# Patient Record
Sex: MALE | Race: WHITE | Employment: OTHER | URBAN - METROPOLITAN AREA
[De-identification: names, ages, dates, MRNs, and addresses within clinical notes are randomized per-mention and may not be internally consistent; named-entity substitution may affect disease eponyms.]

---

## 2017-06-09 ENCOUNTER — HOSPITAL ENCOUNTER (OUTPATIENT)
Dept: NUCLEAR MEDICINE | Age: 82
Discharge: HOME | End: 2017-06-09
Attending: UROLOGY
Payer: MEDICARE

## 2017-06-09 DIAGNOSIS — C61 MALIGNANT NEOPLASM OF PROSTATE (HCC): ICD-10-CM

## 2017-06-09 PROCEDURE — 78306 BONE IMAGING WHOLE BODY: CPT

## 2017-07-18 ENCOUNTER — TELEPHONE (OUTPATIENT)
Dept: NEUROLOGY | Age: 82
End: 2017-07-18

## 2017-07-18 NOTE — TELEPHONE ENCOUNTER
----- Message from Pedro Luisrico Raygoza sent at 7/18/2017 12:36 PM EDT -----  Regarding: /Telephone  Pt's speech therapist(Demetrius Pompa) stated pt 's wife(Kailyn Ramires) left a message 2 wks ago for a NP appt for a  follow up to the ER, but never received a call back. Best contact number [phone].

## 2017-07-19 ENCOUNTER — OFFICE VISIT (OUTPATIENT)
Dept: NEUROLOGY | Age: 82
End: 2017-07-19

## 2017-07-19 VITALS
HEIGHT: 70 IN | DIASTOLIC BLOOD PRESSURE: 58 MMHG | HEART RATE: 52 BPM | SYSTOLIC BLOOD PRESSURE: 116 MMHG | BODY MASS INDEX: 22.19 KG/M2 | WEIGHT: 155 LBS | OXYGEN SATURATION: 97 %

## 2017-07-19 DIAGNOSIS — I65.23 BILATERAL CAROTID ARTERY STENOSIS: ICD-10-CM

## 2017-07-19 DIAGNOSIS — I69.359 CVA, OLD, HEMIPARESIS (HCC): Chronic | ICD-10-CM

## 2017-07-19 DIAGNOSIS — R13.10 DYSPHAGIA, UNSPECIFIED TYPE: ICD-10-CM

## 2017-07-19 DIAGNOSIS — R51.9 HEADACHE, UNSPECIFIED HEADACHE TYPE: ICD-10-CM

## 2017-07-19 DIAGNOSIS — I65.23 BILATERAL CAROTID ARTERY STENOSIS: Primary | ICD-10-CM

## 2017-07-19 DIAGNOSIS — G45.9 TRANSIENT CEREBRAL ISCHEMIA, UNSPECIFIED TYPE: ICD-10-CM

## 2017-07-19 DIAGNOSIS — R29.898 WEAKNESS OF LEFT LEG: Primary | ICD-10-CM

## 2017-07-19 DIAGNOSIS — Z86.73 HX OF COMPLETED STROKE: ICD-10-CM

## 2017-07-19 RX ORDER — AMLODIPINE BESYLATE 5 MG/1
5 TABLET ORAL DAILY
COMMUNITY

## 2017-07-19 RX ORDER — FERROUS SULFATE, DRIED 160(50) MG
1 TABLET, EXTENDED RELEASE ORAL 2 TIMES DAILY WITH MEALS
COMMUNITY

## 2017-07-19 RX ORDER — CARVEDILOL 3.12 MG/1
TABLET ORAL 2 TIMES DAILY WITH MEALS
COMMUNITY
End: 2017-08-11

## 2017-07-19 RX ORDER — CLOPIDOGREL BISULFATE 75 MG/1
75 TABLET ORAL DAILY
Qty: 30 TAB | Refills: 5 | Status: SHIPPED | OUTPATIENT
Start: 2017-07-19 | End: 2018-03-19 | Stop reason: SDUPTHER

## 2017-07-19 RX ORDER — CHOLECALCIFEROL (VITAMIN D3) 125 MCG
100 CAPSULE ORAL DAILY
COMMUNITY

## 2017-07-19 NOTE — PROGRESS NOTES
Name:  General Armenta      :  1930    PCP:   Manny Mobley MD      Referring:  [facility]  MRN:   [id number]    Chief Complaint:   Chief Complaint   Patient presents with    Headache       HISTORY OF PRESENT ILLNESS:     This is a 80 y.o. male who presents for evaluation of headache. Patient originally seen in  for TIA, then last clinic visit in 2013 (see below for details on that). He presents today with wife to discuss new issue, headaches. Pt describes that about 1 week ago, was having dinner with wife and started to have a sharp pain on right side of his head and began to shoot over to the left side of head. Pain was bothering him so they went to ER [facility]) and had CT head (due to prior hx of stroke) and was told that it looked normal.  Was given IV treatment for his his headache and he/ wife say it was better before he left. ER recommended patient follow up with Neurology. A few days later his home PT arrived to do daily therapy and told wife that patient's left leg weaker (historically has left arm weakness >> left leg weakness from a stroke in ). Had a scheduled Speech Therapy visit (part of his [address]) and wife says ST thought patient was having new or increased trouble with eating (holding food in mouth, coughing when eating) so she recommended a swallow study (ordered by PCP per wife). Pt has been taking  mg/ day, Norvasc 5 mg/ day, fish oil (can't take statins d/t side effects). BP looks great, HR a little low (low 50s), on carvedilol (started it few weeks ago, has seen cardiologist since then).      ===================  Prior Hx:    80 y.o. male seen as hospital follow up for an episode of blurred vision in left eye, and focal area of numbness in left side of face occurring in 2012. MRI done at that time didn't reveal a new stroke; chronic bilateral basal ganglia infarcts.  CTA neck at that time showed atherosclerosis of bilateral carotid bifurcations and bilateral cavernous ICA segments but without significant stenosis. There was no evidence of dissection. There was some atherosclerosis at the vertebrobasilar junction, with the right vertebral artery being patent/ dominant. Due to concern for a possible 4mm aneurysm, neurosurgery had been contacted but didn't think it was related to his symptoms at the time, so no acute/ urgent intervention. He followed up with [facility] after discharge and says that his physician over there reviewed the imaging studies and didn't think it was a true aneurysm, rather some atherosclerotic change. At follow up in June 2013 pt complained of numbness in the corner of his eye extending to left side of nose down to left corner of mouth, not involving left forehead, not extending down to jaw, with intermittent speech alteration (wife can't understand what he's saying), no associated  occurring daily but not constant. mittently. Wife says that when Says that when the numbness is coming on, his speech is altered (she has to ask him what he was saying), no No associated facial droop, no increased/ new weakness associated with episodes. Also was having episodic left eye blurred vision, eyes tearing up, no associated headaches. Pt say Ophthalmologist and I was told that that exam was fine. D/w patient that symptoms could be from recurrent TIAs and recommended changing from ASA to Plavix for secondary stroke risk reduction. Patient said that he had a bottle of plavix at home but decided not to start it after considering the possible side effects, but that he would reconsider. Also discussed with him that if the sensory symptoms continue, then I recommend we an EEG to check for simple partial seizure.     Complete Review of Systems: + cough, falls, headaches, hearing loss, incontinence, joint pain, leg swelling, memory loss, muscle pain, muscle weakness, snoring, swallowing; otherwise as noted in HPI No Known Allergies  Past Medical History:   Diagnosis Date    CAD (coronary artery disease)     Cancer ([facility] [address]     prostate    HTN (hypertension) 11/8/2012    Paralysis of upper limb (HCC)     Stroke ([facility]     left sided paralysis     Current Outpatient Prescriptions   Medication Sig Dispense Refill    amLODIPine (NORVASC) 5 mg tablet Take 5 mg by mouth daily.  calcium-vitamin D (OYSTER SHELL) 500 mg(1,250mg) -200 unit per tablet Take 1 Tab by mouth two (2) times daily (with meals).  co-enzyme Q-10 (CO Q-10) 100 mg capsule Take 100 mg by mouth daily.  omega 3-dha-epa-fish oil (FISH OIL) 100-160-1,000 mg cap Take  by mouth.  ACETAMINOPHEN (TYLENOL PO) Take  by mouth.  carvedilol (COREG) 3.125 mg tablet Take  by mouth two (2) times daily (with meals).  clopidogrel (PLAVIX) 75 mg tab Take 1 Tab by mouth daily. 30 Tab 5    polyethylene glycol (MIRALAX) 17 gram/dose powder Take 17 g by mouth daily. 1 tablespoon with 8 oz of water daily 119 g 1    famotidine (PEPCID AC) 20 mg tablet Take 20 mg by mouth two (2) times a day.  pravastatin (PRAVACHOL) 20 mg tablet Take 20 mg by mouth nightly.  METOPROLOL SUCCINATE PO Take 25 mg by mouth daily. Past Surgical History:   Procedure Laterality Date    CARDIAC SURG PROCEDURE UNLIST      CABG    HX APPENDECTOMY      HX HEENT      HX PROSTATECTOMY       Family History   Problem Relation Age of Onset    Heart Disease Father     Cancer Mother      unknown     Social History     Social History    Marital status:      Spouse name: N/A    Number of children: N/A    Years of education: N/A     Occupational History    Not on file.      Social History Main Topics    Smoking status: Former Smoker    Smokeless tobacco: Never Used    Alcohol use No    Drug use: Not on file    Sexual activity: Not on file     Other Topics Concern    Not on file     Social History Narrative       PHYSICAL EXAM  Vitals:    07/19/17 1400   BP: 116/58   BP 1 Location: Right arm   BP Patient Position: Sitting   Pulse: (!) 52   SpO2: 97%   Weight: 70.3 kg (155 lb)   Height: 5' 9.5\" (1.765 m)       General:  Resting in wheelchair, awake, alert, conversant  Holding left arm d/t left arm weakness   Head:  Normocephalic, atraumatic. Eyes:  Conjunctivae/corneas clear   Lungs:  Heart:  Non labored breathing  Regular rate, rhythm   Extremities: No edema. Skin: Mild scattered ecchymosis in forearms/ arms    Neurologic Exam       Language: normal  Memory:  Alert, oriented to person, place, situation    Cranial Nerves:  I: smell Not tested   II: visual fields Full to confrontation   II: pupils Pupils small, 2 mm/ slowly reactive to light    II: optic disc Unable to check due to miosis   III,VII: ptosis none   III,IV,VI: extraocular muscles  normal   V: facial light touch sensation  normal   VII: facial muscle function  symmetric   VIII: hearing symmetric   IX: soft palate elevation  normal   XI: sternocleidomastoid strength 5/5 on right, 1/ 5 on left (old)   XII: tongue  midline      Motor:   Right upper and lower ext: 5/ 5  Left upper ext: spasticity, 3/5  Left lower ext no spasticity, 4/5 proximally, 5/5 distally    Sensory:  Intact LT, temperature in both arms  Mild patchy reduced vibration in lower legs  Intact Temp in both feet and legs    Cerebellar: no rest tremor  Normal FNF on right; can't test left arm d/t weakness    Reflexes: 1+ biceps, patellars, absent achilles  Plantar response: neutral bilaterally    Gait: spastic left hemiparetic gait  Romberg not tested     No outside clinic notes/ imaging reports available for review    ASSESSMENT AND PLAN    ICD-10-CM ICD-9-CM    1. Weakness of left leg R29.898 729.89 MRI BRAIN WO CONT   2. Dysphagia, unspecified type R13.10 787.20    3. Headache, unspecified headache type R51 784.0    4. Bilateral carotid artery stenosis I65.23 433.10 DUPLEX CAROTID BILATERAL AMB NEURO     433.30    5.  Transient cerebral ischemia, unspecified type G45.9 435.9 MRI BRAIN WO CONT      MRA BRAIN WO CONT   6. Hx of completed stroke Z86.73 V12.54 clopidogrel (PLAVIX) 75 mg tab       80 y.o. male with multiple stroke risk factors, with:    1) dysphagia and subjective increased left leg weakness  Discussed patient and wife that left leg doesn't seem very weak to me (almost like prior exam) but his PT who has been working with him more recently than me felt that there was indeed some increased weakness. Similarly, his Speech Therapist felt that his swallowing has worsened and swallow study has been ordered to evaluate for that. D/w patient-wife that it's possible he had a recent TIA or small stroke. Will check MRI Brain, MRA Brain, Carotid Dopplers, to evaluate for that possibility. Advised patient to stop taking ASA and start taking Plavix 75 mg/ day. 2) New onset headache:  Transient and resolved now. No indication to start a daily headache preventive or Rx acute headache pain reliever. 3) Follow up in 3 weeks to go over results.       Sincerely,  Ame Veliz MD

## 2017-07-19 NOTE — MR AVS SNAPSHOT
Visit Information Date & Time Provider Department Dept. Phone Encounter #  
 7/19/2017  2:00 PM Jose Roberto Verde MD [facility] Neurology [facility] [phone] [id number] Upcoming Health Maintenance Date Due DTaP/Tdap/Td series (1 - Tdap) 12/19/1951 ZOSTER VACCINE AGE 60> 12/19/1990 GLAUCOMA SCREENING Q2Y 12/19/1995 Pneumococcal 65+ High/Highest Risk (1 of 2 - PCV13) 12/19/1995 MEDICARE YEARLY EXAM 12/19/1995 INFLUENZA AGE 9 TO ADULT 8/1/2017 Allergies as of 7/19/2017  Review Complete On: 7/19/2017 By: Fitz Jim LPN No Known Allergies Current Immunizations  Never Reviewed No immunizations on file. Not reviewed this visit You Were Diagnosed With   
  
 Codes Comments Weakness of left leg    -  Primary ICD-10-CM: R29.898 ICD-9-CM: 729.89 Hx of completed stroke     ICD-10-CM: Z86.73 
ICD-9-CM: V12.54 Transient cerebral ischemia, unspecified type     ICD-10-CM: G45.9 ICD-9-CM: 435.9 Bilateral carotid artery stenosis     ICD-10-CM: I65.23 ICD-9-CM: 433.10, 433.30 Vitals BP Pulse Height(growth percentile) Weight(growth percentile) SpO2 BMI  
 116/58 (BP 1 Location: Right arm, BP Patient Position: Sitting) (!) 52 5' 9.5\" (1.765 m) 155 lb (70.3 kg) 97% 22.56 kg/m2 Smoking Status Former Smoker BMI and BSA Data Body Mass Index Body Surface Area  
 22.56 kg/m 2 1.86 m 2 Preferred Pharmacy Pharmacy Name Phone [facility] [address] [phone] Your Updated Medication List  
  
   
This list is accurate as of: 7/19/17  3:00 PM.  Always use your most recent med list.  
  
  
  
  
 calcium-vitamin D 500 mg(1,250mg) -200 unit per tablet Commonly known as:  OYSTER SHELL Take 1 Tab by mouth two (2) times daily (with meals). carvedilol 3.125 mg tablet Commonly known as:  Nova Salts  
 Take  by mouth two (2) times daily (with meals). clopidogrel 75 mg Tab Commonly known as:  PLAVIX Take 1 Tab by mouth daily. co-enzyme Q-10 100 mg capsule Commonly known as:  CO Q-10 Take 100 mg by mouth daily. FISH -160-1,000 mg Cap Generic drug:  omega 3-dha-epa-fish oil Take  by mouth. METOPROLOL SUCCINATE PO Take 25 mg by mouth daily. NORVASC 5 mg tablet Generic drug:  amLODIPine Take 5 mg by mouth daily. PEPCID AC 20 mg tablet Generic drug:  famotidine Take 20 mg by mouth two (2) times a day. polyethylene glycol 17 gram/dose powder Commonly known as:  Ferol Carlton Take 17 g by mouth daily. 1 tablespoon with 8 oz of water daily  
  
 pravastatin 20 mg tablet Commonly known as:  PRAVACHOL Take 20 mg by mouth nightly. TYLENOL PO Take  by mouth. Prescriptions Sent to Pharmacy Refills  
 clopidogrel (PLAVIX) 75 mg tab 5 Sig: Take 1 Tab by mouth daily. Class: Normal  
 Pharmacy: [address] #: [phone] Route: Oral  
  
To-Do List   
 07/19/2017 Imaging:  DUPLEX CAROTID BILATERAL AMB NEURO   
  
 07/19/2017 Imaging:  MRA BRAIN WO CONT   
  
 07/19/2017 Imaging:  MRI BRAIN WO CONT \[facility]\"" & [facility] SERVICES! Dear Marissa Salinas: 
Thank you for requesting a [employer] account. Our records indicate that you already have an active [employer] account. You can access your account anytime at [email or web address] Did you know that you can access your hospital and ER discharge instructions at any time in [employer]? You can also review all of your test results from your hospital stay or ER visit. Additional Information If you have questions, please visit the Frequently Asked Questions section of the [employer] website at [email or web address] Remember, [employer] is NOT to be used for urgent needs.  For medical emergencies, dial 911. Now available from your iPhone and Android! Please provide this summary of care documentation to your next provider. Your primary care clinician is listed as Maryuri Daley . If you have any questions after today's visit, please call [phone].

## 2017-07-19 NOTE — PROGRESS NOTES
Patients wife reports 2 weeks ago patient had a headache similar to the one he had during his stroke in 2012. She took him to the [facility] ER on June 24, 2017 and they stated they did not see a stroke on the CT scan. According to wife, his physical therapist has noticed a difference in his gait and facial drooping. Wife states when he swallows a drink he coughs and will hold the liquid in his mouth before swallowing. He has continued to have a headache off and on since June 24th.

## 2017-07-23 PROBLEM — I65.23 BILATERAL CAROTID ARTERY STENOSIS: Status: ACTIVE | Noted: 2017-07-23

## 2017-07-23 PROBLEM — G45.9 TRANSIENT CEREBRAL ISCHEMIA: Status: ACTIVE | Noted: 2017-07-23

## 2017-07-23 PROBLEM — R51.9 HEADACHE: Status: ACTIVE | Noted: 2017-07-23

## 2017-07-23 PROBLEM — R13.10 DYSPHAGIA: Status: ACTIVE | Noted: 2017-07-23

## 2017-07-24 NOTE — PROCEDURES
Carotid Doppler:     Date:  07/19/17    Requesting Physician:  Dr. Eagle Carrasco     Indication:  Carotid stenosis and previous stroke. B-mode imaging reveals mixed plaque at the bifurcations extending into the proximal internal carotid artery segments bilaterally. Doppler spectral analysis reveals no elevated velocities. Vertebral artery flow antegrade on the right, not visualized on the left. Interpretation:  Plaque as noted. Stenosis estimated at 16-49% bilateral ICA. The lack of visualization of the left vertebral artery is likely technical in nature, although consider acquired occlusion if clinically indicated.

## 2017-07-25 ENCOUNTER — TELEPHONE (OUTPATIENT)
Dept: NEUROLOGY | Age: 82
End: 2017-07-25

## 2017-07-25 DIAGNOSIS — R13.10 DYSPHAGIA, UNSPECIFIED TYPE: Primary | ICD-10-CM

## 2017-07-25 NOTE — TELEPHONE ENCOUNTER
Contacted speech therapist Ayala Ledezma with [facility]. She states patient has been having speech and swallowing difficulty. She and wife have reached out to the PCP, but PCP will not order the study without patient going into the office. Patients wife states she does not have the money to keep taking patient to the PCP when all they need is the order. Wife told speech therapist Dr. Stacie Hannon would order the barium swallow study and that it was discussed at last office visit. Please advise.

## 2017-07-25 NOTE — TELEPHONE ENCOUNTER
I can order the study but patient but any orders related to continuing or changing the speech therapy based on the barium swallow study, would have to come from the PCP. So the patient would have to return to the PCP at some point.

## 2017-07-26 ENCOUNTER — HOSPITAL ENCOUNTER (OUTPATIENT)
Dept: MRI IMAGING | Age: 82
Discharge: HOME | End: 2017-07-26
Attending: PSYCHIATRY & NEUROLOGY
Payer: MEDICARE

## 2017-07-26 ENCOUNTER — TELEPHONE (OUTPATIENT)
Dept: NEUROLOGY | Age: 82
End: 2017-07-26

## 2017-07-26 DIAGNOSIS — R29.898 WEAKNESS OF LEFT LEG: ICD-10-CM

## 2017-07-26 DIAGNOSIS — G45.9 TRANSIENT CEREBRAL ISCHEMIA, UNSPECIFIED TYPE: ICD-10-CM

## 2017-07-26 PROCEDURE — 70544 MR ANGIOGRAPHY HEAD W/O DYE: CPT

## 2017-07-26 PROCEDURE — 70551 MRI BRAIN STEM W/O DYE: CPT

## 2017-07-26 NOTE — TELEPHONE ENCOUNTER
Meche Skaggs. Informed her the barium swallow study has been ordered. Informed her further orders regarding speech therapy will have to come from PCP. Also, informed her the study needs to be complete by 8/11/17 since that's when his f/up appt is. Gave her the number for scheduling. She voiced understanding and will call back if any further questions or concerns.

## 2017-08-02 ENCOUNTER — HOSPITAL ENCOUNTER (OUTPATIENT)
Dept: GENERAL RADIOLOGY | Age: 82
Discharge: HOME | End: 2017-08-02
Attending: PSYCHIATRY & NEUROLOGY
Payer: MEDICARE

## 2017-08-02 DIAGNOSIS — R13.10 DYSPHAGIA, UNSPECIFIED TYPE: ICD-10-CM

## 2017-08-02 PROCEDURE — 74220 X-RAY XM ESOPHAGUS 1CNTRST: CPT

## 2017-08-03 ENCOUNTER — TELEPHONE (OUTPATIENT)
Dept: NEUROLOGY | Age: 82
End: 2017-08-03

## 2017-08-03 DIAGNOSIS — R13.10 DYSPHAGIA, UNSPECIFIED TYPE: Primary | ICD-10-CM

## 2017-08-03 NOTE — TELEPHONE ENCOUNTER
Received call from Russell Kingsley at [facility]. She states patient had the wrong barium swallow study done. Patient needs the Modified Barium Swallow Study. She spoke with BODØ at the radiology dept and they are willing to redo the correct test free of charge but they need an order for it. Explained to her Dr José Manuel Zazueta is out of the office, but I will see if MD on call will sign order. She will need it faxed to Liset's attention at [phone].

## 2017-08-07 ENCOUNTER — HOSPITAL ENCOUNTER (OUTPATIENT)
Dept: GENERAL RADIOLOGY | Age: 82
Discharge: HOME | End: 2017-08-07
Attending: PSYCHIATRY & NEUROLOGY
Payer: MEDICARE

## 2017-08-07 DIAGNOSIS — R13.12 OROPHARYNGEAL DYSPHAGIA: ICD-10-CM

## 2017-08-07 DIAGNOSIS — R13.10 DYSPHAGIA: ICD-10-CM

## 2017-08-07 PROCEDURE — 74230 X-RAY XM SWLNG FUNCJ C+: CPT

## 2017-08-07 PROCEDURE — G8996 SWALLOW CURRENT STATUS: HCPCS

## 2017-08-07 PROCEDURE — G8998 SWALLOW D/C STATUS: HCPCS

## 2017-08-07 PROCEDURE — G8997 SWALLOW GOAL STATUS: HCPCS

## 2017-08-07 PROCEDURE — 92611 MOTION FLUOROSCOPY/SWALLOW: CPT

## 2017-08-07 NOTE — PROGRESS NOTES
[address]    Speech Pathology Modified barium swallow Study with cms g codes  Patient: Dipak Sosa (89 y.o. male)  Date: 8/7/2017  Referring Provider:  Alexei Landry:   Patient had no c/o dysphagia. He reports a CVA sometime in the last year. OBJECTIVE:   Past Medical History:   Past Medical History:   Diagnosis Date    CAD (coronary artery disease)     Cancer ([facility] [address]     prostate    HTN (hypertension) 11/8/2012    Paralysis of upper limb ([facility] [address]     Stroke ([facility] [address]     left sided paralysis     Past Surgical History:   Procedure Laterality Date    CARDIAC SURG PROCEDURE UNLIST      CABG    HX APPENDECTOMY      HX HEENT      HX PROSTATECTOMY       Current Dietary Status:  Regular, thins? Radiologist: Cherri Hamman  Film Views: Lateral  Patient Position: upright in Hausted chair    Trial 1: Trial 2:   Consistency Presented: Thin liquid; Solid;Pill/Tablet;Puree     How Presented: Self-fed/presented;Cup/gulp; Spoon;Straw      ORAL PHASE:      Bolus Acceptance: No impairment     Bolus Formation/Control: No impairment:    :     Propulsion: No impairment     Oral Residue: Lingual (mild)   PHARYNGEAL PHASE:      Initiation of Swallow: Triggered at vallecula     Timing: Pooling 1-5 sec     Penetration: Flash/transient (with thins)      Pharynx cleared after the swallow. When xray was turned on in between swallows, he had some mild pyriform residue. ?  From oral residue (he c/o material coating in and around teeth) or reflux. (probably not, since radiologist followed pill down through esophagus without issues)                                     Trial 3: Trial 4:                            :    :                                                                                                    In compliance with CMSs Claims Based Outcome Reporting, the following G-code set was chosen for this patient based the use of the [facility] functional outcome to quantify this patient's level of swallowing impairment. Using the NOMS, the patient was determined to be at level 6 for swallow function which correlates with the CI= 1-19% level of severity. Based on the objective assessment provided within this note, the current, goal, and discharge g-codes are as follows:    Swallow  Swallowing:   Swallow Current Status CI= 1-19%   Swallow Goal Status CI= 1-19%   Swallow D/C Status CI= 1-19%        NOMS Swallowing Levels:  Level 1 (CN): NPO  Level 2 (CM): NPO but takes consistency in therapy  Level 3 (CL): Takes less than 50% of nutrition p.o. and continues with nonoral feedings; and/or safe with mod cues; and/or max diet restriction  Level 4 (CK): Safe swallow but needs mod cues; and/or mod diet restriction; and/or still requires some nonoral feeding/supplements  Level 5 (CJ): Safe swallow with min diet restriction; and/or needs min cues  Level 6 (CI): Independent with p.o.; rare cues; usually self cues; may need to avoid some foods or needs extra time  Level 7 ([address] [place] [address]): Independent for all p.o.  SELVIN. (2003). National Outcomes Measurement System (NOMS): Adult Speech-Language Pathology User's Guide. ASSESSMENT :  Based on the objective data described above, the patient presents with mild oral-pharyngeal dysphagia with possible mild oral residue that possibly spilled to pharynx after the swallow. Mild penetration with thins that cleared with the force of the swallow. PLAN/RECOMMENDATIONS :  Patient appears safe for regular diet, thin liquids. Watch during a meal for oral residue or s/s of reflux. COMMUNICATION/EDUCATION:   The above findings and recommendations were discussed with: patient who verbalized understanding.     Thank you for this referral.Denise Fraser Gosselin, SLP  Time Calculation: 20 mins

## 2017-08-11 ENCOUNTER — OFFICE VISIT (OUTPATIENT)
Dept: NEUROLOGY | Age: 82
End: 2017-08-11

## 2017-08-11 VITALS
SYSTOLIC BLOOD PRESSURE: 110 MMHG | HEART RATE: 51 BPM | BODY MASS INDEX: 22.19 KG/M2 | OXYGEN SATURATION: 98 % | DIASTOLIC BLOOD PRESSURE: 68 MMHG | HEIGHT: 70 IN | WEIGHT: 155 LBS

## 2017-08-11 DIAGNOSIS — R13.10 DYSPHAGIA, UNSPECIFIED TYPE: ICD-10-CM

## 2017-08-11 DIAGNOSIS — R29.898 TRANSIENT LEFT LEG WEAKNESS: Primary | ICD-10-CM

## 2017-08-11 DIAGNOSIS — I69.359 CVA, OLD, HEMIPARESIS (HCC): Chronic | ICD-10-CM

## 2017-08-11 NOTE — MR AVS SNAPSHOT
Visit Information Date & Time Provider Department Dept. Phone Encounter #  
 8/11/2017 11:00 AM Godfrey Luo MD [facility] Neurology [facility] [phone] [id number] Follow-up Instructions Return if symptoms worsen or fail to improve. Upcoming Health Maintenance Date Due DTaP/Tdap/Td series (1 - Tdap) 12/19/1951 ZOSTER VACCINE AGE 60> 10/19/1990 GLAUCOMA SCREENING Q2Y 12/19/1995 Pneumococcal 65+ High/Highest Risk (1 of 2 - PCV13) 12/19/1995 MEDICARE YEARLY EXAM 12/19/1995 INFLUENZA AGE 9 TO ADULT 8/1/2017 Allergies as of 8/11/2017  Review Complete On: 8/11/2017 By: Terrie Mohan LPN No Known Allergies Current Immunizations  Never Reviewed No immunizations on file. Not reviewed this visit Vitals BP Pulse Height(growth percentile) Weight(growth percentile) SpO2 BMI  
 110/68 (BP 1 Location: Left arm, BP Patient Position: Sitting) (!) 51 5' 9.5\" (1.765 m) 155 lb (70.3 kg) 98% 22.56 kg/m2 Smoking Status Former Smoker BMI and BSA Data Body Mass Index Body Surface Area  
 22.56 kg/m 2 1.86 m 2 Preferred Pharmacy Pharmacy Name Phone [facility] [address] [phone] Your Updated Medication List  
  
   
This list is accurate as of: 8/11/17 11:29 AM.  Always use your most recent med list.  
  
  
  
  
 calcium-vitamin D 500 mg(1,250mg) -200 unit per tablet Commonly known as:  OYSTER SHELL Take 1 Tab by mouth two (2) times daily (with meals). clopidogrel 75 mg Tab Commonly known as:  PLAVIX Take 1 Tab by mouth daily. co-enzyme Q-10 100 mg capsule Commonly known as:  CO Q-10 Take 100 mg by mouth daily. FISH -160-1,000 mg Cap Generic drug:  omega 3-dha-epa-fish oil Take  by mouth. NORVASC 5 mg tablet Generic drug:  amLODIPine Take 5 mg by mouth daily. PEPCID AC 20 mg tablet Generic drug:  famotidine Take 20 mg by mouth two (2) times a day. polyethylene glycol 17 gram/dose powder Commonly known as:  Nova Hussar Take 17 g by mouth daily. 1 tablespoon with 8 oz of water daily TYLENOL PO Take  by mouth. Follow-up Instructions Return if symptoms worsen or fail to improve. Introducing \[facility]\"" & [facility] SERVICES! Dear Ayesha Way: 
Thank you for requesting a [employer] account. Our records indicate that you already have an active [employer] account. You can access your account anytime at [email or web address] Did you know that you can access your hospital and ER discharge instructions at any time in [employer]? You can also review all of your test results from your hospital stay or ER visit. Additional Information If you have questions, please visit the Frequently Asked Questions section of the [employer] website at [email or web address] Remember, [employer] is NOT to be used for urgent needs. For medical emergencies, dial 911. Now available from your iPhone and Android! Please provide this summary of care documentation to your next provider. Your primary care clinician is listed as Lili Galvan. If you have any questions after today's visit, please call [phone].

## 2017-08-11 NOTE — PROGRESS NOTES
Interval HPI:   This is a 80 y.o. male who is following up for     Chief Complaint   Patient presents with    Results     Interval hx    Patient following up with wife to go over test results regarding dysphagia (noted by his Home Health/ Speech Therapist) and subjective increased left leg weakness (noted by his Home Health/ PT). Checked MRI Brain, MRA Brain, Carotid Dopplers, to evaluate for possible recurrent stroke. Advised him to stop taking ASA and start taking Plavix 75 mg/ day. MRI Brain: no evidence of recent stroke; has mild-moderate age-appropriate cerebral atrophy, moderate chronic small vessel ischemic changes. MRA Brain shows dominant right vertebral artery, small left vertebral artery, patent basilar artery, atherosclerosis in the cavernous ICAs, no aneurysms or areas of significant stenosis. Carotid Dopplers:  Stenosis estimated at 16-49% bilateral ICA. The lack of visualization of the left vertebral artery is likely technical in nature, although consider acquired occlusion if clinically indicated. Underwent Barium Swallow study and that didn't show any significant problems (report reviewed). Not complaining of any new symptoms, still feels like things get caught in throat from time to time.        ===================  Prior Hx:     80 y.o. male seen as hospital follow up for an episode of blurred vision in left eye, and focal area of numbness in left side of face occurring in Nov 2012. MRI done at that time didn't reveal a new stroke; chronic bilateral basal ganglia infarcts.  CTA neck at that time showed atherosclerosis of bilateral carotid bifurcations and bilateral cavernous ICA segments but without significant stenosis.  There was no evidence of dissection.   There was some atherosclerosis at the vertebrobasilar junction, with the right vertebral artery being patent/ dominant.  Due to concern for a possible 4mm aneurysm, neurosurgery had been contacted but didn't think it was related to his symptoms at the time, so no acute/ urgent intervention.  He followed up with [facility] after discharge and says that his physician over there reviewed the imaging studies and didn't think it was a true aneurysm, rather some atherosclerotic change. At follow up in June 2013 pt complained of numbness in the corner of his eye extending to left side of nose down to left corner of mouth, not involving left forehead, not extending down to jaw, with intermittent speech alteration (wife can't understand what he's saying), no associated  occurring daily but not constant. mittently. Bufford Collet says that when Says that when the numbness is coming on, his speech is altered (she has to ask him what he was saying), no No associated facial droop, no increased/ new weakness associated with episodes. Also was having episodic left eye blurred vision, eyes tearing up, no associated headaches. Pt say Ophthalmologist and I was told that that exam was fine. D/w patient that symptoms could be from recurrent TIAs and recommended changing from ASA to Plavix for secondary stroke risk reduction.  Patient said that he had a bottle of plavix at home but decided not to start it after considering the possible side effects, but that he would reconsider. Also discussed with him that if the sensory symptoms continue, then I recommend we an EEG to check for simple partial seizure. Brief ROS: as above or otherwise negative  There have been no significant changes in PMHx, PSHx, SHx except as noted above. No Known Allergies  Current Outpatient Prescriptions   Medication Sig Dispense Refill    amLODIPine (NORVASC) 5 mg tablet Take 5 mg by mouth daily.  calcium-vitamin D (OYSTER SHELL) 500 mg(1,250mg) -200 unit per tablet Take 1 Tab by mouth two (2) times daily (with meals).  co-enzyme Q-10 (CO Q-10) 100 mg capsule Take 100 mg by mouth daily.       omega 3-dha-epa-fish oil (FISH OIL) 100-160-1,000 mg cap Take  by mouth.  ACETAMINOPHEN (TYLENOL PO) Take  by mouth.  clopidogrel (PLAVIX) 75 mg tab Take 1 Tab by mouth daily. 30 Tab 5    polyethylene glycol (MIRALAX) 17 gram/dose powder Take 17 g by mouth daily. 1 tablespoon with 8 oz of water daily 119 g 1    famotidine (PEPCID AC) 20 mg tablet Take 20 mg by mouth two (2) times a day. Physical Exam  Blood pressure 110/68, pulse (!) 51, height 5' 9.5\" (1.765 m), weight 70.3 kg (155 lb), SpO2 98 %. No acute distress  Neck: no stiffness  Skin: no rashes    Focused Neurological Exam     Mental status: Alert and oriented to person, place situation. Language: normal fluency and comprehension; no dysarthria. CNs:   Visual fields grossly normal  Extraocular movements intact, no nystagmus  Face appears symmetric and facial strength normal.    Hearing is intact to casual conversation. Sensory: intact light touch in all 4 extremities  Motor:   Right upper/ lower ext: 5/ 5  Left upper ext: 3/5 (spasticity)  Left lower ext: 4/ 5 proximally, 5/ 5 distally    Reflexes: 1+ biceps, patellars (symmetric)  Gait: not observed today    Impression      ICD-10-CM ICD-9-CM    1. Transient left leg weakness R29.898 729.89    2. Dysphagia, unspecified type R13.10 787.20    3. CVA, old, hemiparesis [facility]) I69.359 18.23        80 y.o. 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 Has been having subjective dysphagia, Barium Swallow study was largely unremarkable. Home PT felt that left leg strength was worsened, but I couldn't appreciate that on my exam (when compared to past exam) and MRI Brain doesn't show any new areas of stroke. Advised pt to continue taking the Plavix 75 mg/ day and stay on his BP meds (looks great today). LDL in Nov 2012 was slightly higher than goal (< 70 in setting of hx of TIA/ stroke). Recommend you recheck FLP and if LDL remains above 70, then start low-dose statin.   No other active neurologic problems that warrant regular follow up visits. He's welcome to return at any time.

## 2017-09-06 PROBLEM — R29.898 TRANSIENT LEFT LEG WEAKNESS: Status: ACTIVE | Noted: 2017-09-06

## 2018-02-23 DIAGNOSIS — Z86.73 HX OF COMPLETED STROKE: ICD-10-CM

## 2018-02-23 RX ORDER — CLOPIDOGREL BISULFATE 75 MG/1
TABLET ORAL
Qty: 30 TAB | Refills: 5 | OUTPATIENT
Start: 2018-02-23

## 2018-02-23 NOTE — TELEPHONE ENCOUNTER
Received refill request from pharmacy for his Plavix.   Let pt know that he will need to ask his PCP to renew his Plavix prescription

## 2018-02-23 NOTE — TELEPHONE ENCOUNTER
Contacted patient and spoke with wife. Advised her to contact PCP to refill patients Plavix. She voiced understanding and will call back if any further questions or concerns.

## 2018-03-16 ENCOUNTER — PATIENT MESSAGE (OUTPATIENT)
Dept: NEUROLOGY | Age: 83
End: 2018-03-16

## 2018-03-19 DIAGNOSIS — Z86.73 HX OF COMPLETED STROKE: Primary | ICD-10-CM

## 2018-03-19 RX ORDER — CLOPIDOGREL BISULFATE 75 MG/1
75 TABLET ORAL DAILY
Qty: 30 TAB | Refills: 3 | Status: SHIPPED | OUTPATIENT
Start: 2018-03-19

## 2018-07-24 ENCOUNTER — HOSPICE ADMISSION (OUTPATIENT)
Dept: HOSPICE | Facility: HOSPICE | Age: 83
End: 2018-07-24
Payer: OTHER MISCELLANEOUS

## 2018-07-24 PROCEDURE — 3336500001 HSPC ELECTION

## 2018-07-24 PROCEDURE — 0655 HSPC INPATIENT RESPITE

## 2018-07-25 PROCEDURE — 0655 HSPC INPATIENT RESPITE

## 2018-07-26 PROCEDURE — 0655 HSPC INPATIENT RESPITE

## 2018-07-27 PROCEDURE — 0655 HSPC INPATIENT RESPITE

## 2018-07-28 PROCEDURE — 0655 HSPC INPATIENT RESPITE

## 2018-07-29 PROCEDURE — 0651 HSPC ROUTINE HOME CARE

## 2018-07-30 PROCEDURE — 0651 HSPC ROUTINE HOME CARE

## 2018-07-31 PROCEDURE — 0651 HSPC ROUTINE HOME CARE

## 2018-08-01 PROCEDURE — 0651 HSPC ROUTINE HOME CARE

## 2018-08-02 PROCEDURE — 0651 HSPC ROUTINE HOME CARE

## 2018-08-03 PROCEDURE — 0651 HSPC ROUTINE HOME CARE

## 2018-08-04 PROCEDURE — 0651 HSPC ROUTINE HOME CARE

## 2018-08-05 PROCEDURE — 0651 HSPC ROUTINE HOME CARE

## 2018-08-06 PROCEDURE — 0651 HSPC ROUTINE HOME CARE

## 2018-08-07 PROCEDURE — 0651 HSPC ROUTINE HOME CARE

## 2018-08-08 PROCEDURE — 0651 HSPC ROUTINE HOME CARE

## 2018-08-09 PROCEDURE — 0651 HSPC ROUTINE HOME CARE

## 2018-08-10 PROCEDURE — 0651 HSPC ROUTINE HOME CARE

## 2018-08-11 PROCEDURE — 0651 HSPC ROUTINE HOME CARE

## 2018-08-12 PROCEDURE — 0651 HSPC ROUTINE HOME CARE

## 2018-08-13 PROCEDURE — 0651 HSPC ROUTINE HOME CARE

## 2018-08-14 PROCEDURE — 0651 HSPC ROUTINE HOME CARE

## 2018-08-15 PROCEDURE — 0651 HSPC ROUTINE HOME CARE

## 2018-08-16 PROCEDURE — 0651 HSPC ROUTINE HOME CARE

## 2018-08-17 PROCEDURE — 0651 HSPC ROUTINE HOME CARE

## 2018-08-18 PROCEDURE — 0651 HSPC ROUTINE HOME CARE

## 2018-08-19 PROCEDURE — 0651 HSPC ROUTINE HOME CARE

## 2018-08-20 PROCEDURE — 0651 HSPC ROUTINE HOME CARE

## 2021-08-03 PROBLEM — I63.9 STROKE (HCC): Status: RESOLVED | Noted: 2021-08-03 | Resolved: 2021-08-03
